# Patient Record
Sex: MALE | Race: WHITE | NOT HISPANIC OR LATINO | Employment: FULL TIME | URBAN - METROPOLITAN AREA
[De-identification: names, ages, dates, MRNs, and addresses within clinical notes are randomized per-mention and may not be internally consistent; named-entity substitution may affect disease eponyms.]

---

## 2022-02-07 ENCOUNTER — HOSPITAL ENCOUNTER (OUTPATIENT)
Dept: GENERAL RADIOLOGY | Facility: HOSPITAL | Age: 52
Discharge: HOME | End: 2022-02-07
Admitting: NURSE PRACTITIONER

## 2022-02-07 ENCOUNTER — OFFICE VISIT (OUTPATIENT)
Dept: FAMILY MEDICINE CLINIC | Age: 52
End: 2022-02-07

## 2022-02-07 VITALS
WEIGHT: 192 LBS | SYSTOLIC BLOOD PRESSURE: 129 MMHG | DIASTOLIC BLOOD PRESSURE: 86 MMHG | OXYGEN SATURATION: 93 % | BODY MASS INDEX: 29.1 KG/M2 | TEMPERATURE: 98.5 F | HEART RATE: 106 BPM | HEIGHT: 68 IN

## 2022-02-07 DIAGNOSIS — R06.02 SHORTNESS OF BREATH: ICD-10-CM

## 2022-02-07 DIAGNOSIS — G80.9 CEREBRAL PALSY, UNSPECIFIED TYPE: ICD-10-CM

## 2022-02-07 DIAGNOSIS — J12.82 PNEUMONIA DUE TO 2019 NOVEL CORONAVIRUS: Primary | ICD-10-CM

## 2022-02-07 DIAGNOSIS — U07.1 PNEUMONIA DUE TO 2019 NOVEL CORONAVIRUS: Primary | ICD-10-CM

## 2022-02-07 PROCEDURE — 99204 OFFICE O/P NEW MOD 45 MIN: CPT | Performed by: NURSE PRACTITIONER

## 2022-02-07 PROCEDURE — 71046 X-RAY EXAM CHEST 2 VIEWS: CPT

## 2022-02-07 RX ORDER — DOXYCYCLINE HYCLATE 100 MG/1
100 CAPSULE ORAL 2 TIMES DAILY
Qty: 14 CAPSULE | Refills: 0 | Status: SHIPPED | OUTPATIENT
Start: 2022-02-07 | End: 2022-02-14

## 2022-02-07 NOTE — PROGRESS NOTES
"Chief Complaint  Establish Care    Subjective    Patient is a 51-year-old male in today with complaints of continued shortness of breath after being diagnosed with Covid on January 27.  Patient is accompanied by wife, who reports he passed out at home on 27 January.  She called the ambulance but were patient refused to go by ambulance and was transported to the emergency room via vehicle.  Patient reports they ran several test diagnosed him with Covid pneumonia and sent him home without any medications or treatment.  Patient reports feeling some better over the past couple of days but continues to have shortness of breath with minimal exertion,, and fatigue.  Denies fever at this time.          Albaro Doyle presents to [facility]  Shortness of Breath  This is a new problem. The current episode started 1 to 4 weeks ago. The problem occurs intermittently. The problem has been gradually improving. Pertinent negatives include no chest pain, fever, headaches, rhinorrhea, sore throat or sputum production. The symptoms are aggravated by exercise and URIs. The patient has no known risk factors for DVT/PE. He has tried OTC inhalers for the symptoms. The treatment provided mild relief.       Objective   Vital Signs:   /86 (BP Location: Left arm, Patient Position: Sitting)   Pulse 106   Temp 98.5 °F (36.9 °C) (Oral)   Ht 172.7 cm (68\")   Wt 87.1 kg (192 lb)   SpO2 93%   BMI 29.19 kg/m²     Physical Exam  HENT:      Head: Normocephalic.      Nose: Nose normal.   Cardiovascular:      Rate and Rhythm: Regular rhythm. Tachycardia present.   Pulmonary:      Effort: Pulmonary effort is normal. No respiratory distress.      Breath sounds: Normal breath sounds. No stridor. No wheezing, rhonchi or rales.   Chest:      Chest wall: Tenderness present.   Skin:     General: Skin is warm and dry.   Neurological:      Mental Status: He is alert and oriented to person, place, and time. "   Psychiatric:         Mood and Affect: Mood normal.        Result Review :                 Assessment and Plan    Diagnoses and all orders for this visit:    1. Pneumonia due to 2019 novel coronavirus (Primary)  -     doxycycline (VIBRAMYCIN) 100 MG capsule; Take 1 capsule by mouth 2 (Two) Times a Day for 7 days.  Dispense: 14 capsule; Refill: 0    2. Shortness of breath  -     XR Chest PA & Lateral; Future        Follow Up   Return if symptoms worsen or fail to improve.  Patient was given instructions and counseling regarding his condition or for health maintenance advice. Please see specific information pulled into the AVS if appropriate.

## 2022-02-08 ENCOUNTER — TELEPHONE (OUTPATIENT)
Dept: FAMILY MEDICINE CLINIC | Age: 52
End: 2022-02-08

## 2022-02-11 PROBLEM — G80.9 CEREBRAL PALSY: Status: ACTIVE | Noted: 2022-02-11

## 2022-02-17 ENCOUNTER — OFFICE VISIT (OUTPATIENT)
Dept: FAMILY MEDICINE CLINIC | Age: 52
End: 2022-02-17

## 2022-02-17 VITALS — TEMPERATURE: 98.1 F | SYSTOLIC BLOOD PRESSURE: 125 MMHG | HEART RATE: 113 BPM | DIASTOLIC BLOOD PRESSURE: 78 MMHG

## 2022-02-17 DIAGNOSIS — R06.02 PERSISTENT SHORTNESS OF BREATH AFTER COVID-19: Primary | ICD-10-CM

## 2022-02-17 DIAGNOSIS — U09.9 PERSISTENT SHORTNESS OF BREATH AFTER COVID-19: Primary | ICD-10-CM

## 2022-02-17 PROCEDURE — 99212 OFFICE O/P EST SF 10 MIN: CPT | Performed by: NURSE PRACTITIONER

## 2022-02-17 NOTE — PROGRESS NOTES
Chief Complaint  work release (post covid)    Subjective    Patient is a 51 year old male in today for a note for work after having covid. Patient reports feeling much better at this time but reports lingering headache and shortness of breath with exertion. Patient would like to limit his hours when returning to work to combate these symptoms.           Albaro Doyle presents to [facility]  Shortness of Breath  This is a new problem. The current episode started more than 1 month ago. The problem has been gradually improving. Associated symptoms include headaches. Pertinent negatives include no fever, rhinorrhea or sputum production. The symptoms are aggravated by any activity. The patient has no known risk factors for DVT/PE. He has tried nothing for the symptoms.       Objective   Vital Signs:   /78 (BP Location: Left arm, Patient Position: Sitting)   Pulse 113   Temp 98.1 °F (36.7 °C) (Oral)     Physical Exam  HENT:      Head: Normocephalic.   Cardiovascular:      Rate and Rhythm: Normal rate and regular rhythm.   Pulmonary:      Effort: Pulmonary effort is normal. No respiratory distress.      Breath sounds: Normal breath sounds. No stridor. No wheezing, rhonchi or rales.   Chest:      Chest wall: No tenderness.   Skin:     General: Skin is dry.   Neurological:      Mental Status: He is alert and oriented to person, place, and time.   Psychiatric:         Mood and Affect: Mood normal.        Result Review :                 Assessment and Plan    Diagnoses and all orders for this visit:    1. Persistent shortness of breath after COVID-19 (Primary)  Comments:  Short term disability papers complete, note to decrease work hours to 40 per week for one month provided        Follow Up   Return in about 1 year (around 2/17/2023), or if symptoms worsen or fail to improve.  Patient was given instructions and counseling regarding his condition or for health maintenance advice.  Please see specific information pulled into the AVS if appropriate.